# Patient Record
Sex: FEMALE | URBAN - METROPOLITAN AREA
[De-identification: names, ages, dates, MRNs, and addresses within clinical notes are randomized per-mention and may not be internally consistent; named-entity substitution may affect disease eponyms.]

---

## 2023-05-03 ENCOUNTER — OFFICE VISIT (OUTPATIENT)
Dept: URBAN - METROPOLITAN AREA CLINIC 35 | Facility: CLINIC | Age: 53
End: 2023-05-03

## 2023-05-03 NOTE — HPI-GERD
patient presents for heartburn consult. Patient states he has been experiencing symptoms since ___. Patient admits /denies currently taking any OTC or prescribed medications for relief of symptoms. Patient describes symptoms as__and states they are most present during or after ___. Patient admits /denies nighttime symptoms. Patient admits /denies nausea or vomiting. Patient admits /denies any associated weight loss. Patient admits/denies EGD in the past.

## 2023-05-03 NOTE — HPI-BLOOD IN STOOL
Patient presents today for a consultation about rectal bleeding. Bleeding started __ and happens __ times a __. The blood is __ in color and is present __. Patient currently admits __ bowel movements per __. S/He denies any mucus, melena, pruritus ani, rectal pain, or abdominal cramping/pain. Patient has tried __ with __ relief of symptoms.  Patient denies/admits a previous colonoscopy.

## 2023-05-15 ENCOUNTER — WEB ENCOUNTER (OUTPATIENT)
Dept: URBAN - METROPOLITAN AREA CLINIC 35 | Facility: CLINIC | Age: 53
End: 2023-05-15

## 2023-06-01 ENCOUNTER — OFFICE VISIT (OUTPATIENT)
Dept: URBAN - METROPOLITAN AREA CLINIC 37 | Facility: CLINIC | Age: 53
End: 2023-06-01

## 2023-06-01 ENCOUNTER — DASHBOARD ENCOUNTERS (OUTPATIENT)
Age: 53
End: 2023-06-01